# Patient Record
Sex: FEMALE | Race: WHITE | HISPANIC OR LATINO
[De-identification: names, ages, dates, MRNs, and addresses within clinical notes are randomized per-mention and may not be internally consistent; named-entity substitution may affect disease eponyms.]

---

## 2021-01-01 ENCOUNTER — OFFICE VISIT (OUTPATIENT)
Age: 71
End: 2021-01-01

## 2021-04-07 ENCOUNTER — OFFICE VISIT (OUTPATIENT)
Age: 71
End: 2021-04-07

## 2021-04-28 ENCOUNTER — OFFICE VISIT (OUTPATIENT)
Age: 71
End: 2021-04-28

## 2022-04-06 ENCOUNTER — OFFICE VISIT (OUTPATIENT)
Dept: URBAN - METROPOLITAN AREA CLINIC 68 | Facility: CLINIC | Age: 72
End: 2022-04-06

## 2022-05-25 ENCOUNTER — TELEPHONE ENCOUNTER (OUTPATIENT)
Dept: URBAN - METROPOLITAN AREA CLINIC 68 | Facility: CLINIC | Age: 72
End: 2022-05-25

## 2022-06-04 ENCOUNTER — TELEPHONE ENCOUNTER (OUTPATIENT)
Dept: URBAN - METROPOLITAN AREA CLINIC 68 | Facility: CLINIC | Age: 72
End: 2022-06-04

## 2022-06-04 RX ORDER — TRAMADOL HYDROCHLORIDE 50 MG/1
TRAMADOL HCL( 50MG ORAL  TWO TIMES DAILY ) INACTIVE -HX ENTRY TABLET ORAL
OUTPATIENT
Start: 2017-03-22

## 2022-06-05 ENCOUNTER — TELEPHONE ENCOUNTER (OUTPATIENT)
Dept: URBAN - METROPOLITAN AREA CLINIC 68 | Facility: CLINIC | Age: 72
End: 2022-06-05

## 2022-06-05 RX ORDER — MELOXICAM 15 MG/1
MELOXICAM( 15MG ORAL  DAILY ) ACTIVE -HX ENTRY TABLET ORAL DAILY
Status: ACTIVE | COMMUNITY
Start: 2017-03-22

## 2022-06-05 RX ORDER — LEVOTHYROXINE SODIUM 112 UG/1
SYNTHROID( 112MCG ORAL  DAILY ) ACTIVE -HX ENTRY TABLET ORAL DAILY
Status: ACTIVE | COMMUNITY
Start: 2017-03-22

## 2022-06-17 ENCOUNTER — OFFICE VISIT (OUTPATIENT)
Dept: URBAN - METROPOLITAN AREA CLINIC 68 | Facility: CLINIC | Age: 72
End: 2022-06-17

## 2022-06-25 ENCOUNTER — TELEPHONE ENCOUNTER (OUTPATIENT)
Age: 72
End: 2022-06-25

## 2022-06-25 RX ORDER — SODIUM SULFATE, POTASSIUM SULFATE, MAGNESIUM SULFATE 17.5; 3.13; 1.6 G/ML; G/ML; G/ML
SOLUTION, CONCENTRATE ORAL AS DIRECTED
Qty: 1 | Refills: 0 | OUTPATIENT
Start: 2017-03-22 | End: 2017-03-23

## 2022-06-25 RX ORDER — SODIUM SULFATE, POTASSIUM SULFATE, MAGNESIUM SULFATE 17.5; 3.13; 1.6 G/ML; G/ML; G/ML
SOLUTION, CONCENTRATE ORAL AS DIRECTED
Qty: 1 | Refills: 0 | OUTPATIENT
Start: 2011-10-28 | End: 2017-03-22

## 2022-06-25 RX ORDER — TRAMADOL HYDROCHLORIDE 50 MG/1
TRAMADOL HCL( 50MG ORAL  TWO TIMES DAILY ) INACTIVE -HX ENTRY TABLET ORAL
OUTPATIENT
Start: 2017-03-22

## 2022-06-26 ENCOUNTER — TELEPHONE ENCOUNTER (OUTPATIENT)
Age: 72
End: 2022-06-26

## 2022-06-26 RX ORDER — SODIUM SULFATE, MAGNESIUM SULFATE, AND POTASSIUM CHLORIDE 17.75; 2.7; 2.25 G/1; G/1; G/1
TABLET ORAL AS DIRECTED
Qty: 24 | Refills: 0 | Status: ACTIVE | COMMUNITY
Start: 2022-06-17

## 2022-06-26 RX ORDER — MELOXICAM 15 MG/1
MELOXICAM( 15MG ORAL  DAILY ) ACTIVE -HX ENTRY TABLET ORAL DAILY
Status: ACTIVE | COMMUNITY
Start: 2017-03-22

## 2022-06-26 RX ORDER — LEVOTHYROXINE SODIUM 112 MCG
SYNTHROID( 112MCG ORAL  DAILY ) ACTIVE -HX ENTRY TABLET ORAL DAILY
Status: ACTIVE | COMMUNITY
Start: 2017-03-22

## 2022-06-26 RX ORDER — CHOLECALCIFEROL (VITAMIN D3) 25 MCG
VITAMIN D( 1000UNIT ORAL  DAILY ) ACTIVE -HX ENTRY TABLET ORAL DAILY
Status: ACTIVE | COMMUNITY
Start: 2017-03-22

## 2022-08-15 ENCOUNTER — OFFICE VISIT (OUTPATIENT)
Dept: URBAN - METROPOLITAN AREA SURGERY CENTER 12 | Facility: SURGERY CENTER | Age: 72
End: 2022-08-15

## 2022-08-15 RX ORDER — MELOXICAM 15 MG/1
MELOXICAM( 15MG ORAL  DAILY ) ACTIVE -HX ENTRY TABLET ORAL DAILY
Status: ACTIVE | COMMUNITY
Start: 2017-03-22

## 2022-08-15 RX ORDER — LEVOTHYROXINE SODIUM 112 UG/1
SYNTHROID( 112MCG ORAL  DAILY ) ACTIVE -HX ENTRY TABLET ORAL DAILY
Status: ACTIVE | COMMUNITY
Start: 2017-03-22

## 2022-08-26 ENCOUNTER — OFFICE VISIT (OUTPATIENT)
Dept: URBAN - METROPOLITAN AREA CLINIC 68 | Facility: CLINIC | Age: 72
End: 2022-08-26

## 2022-08-26 ENCOUNTER — TELEPHONE ENCOUNTER (OUTPATIENT)
Dept: URBAN - METROPOLITAN AREA CLINIC 68 | Facility: CLINIC | Age: 72
End: 2022-08-26

## 2022-08-26 ENCOUNTER — DASHBOARD ENCOUNTERS (OUTPATIENT)
Age: 72
End: 2022-08-26

## 2022-08-26 RX ORDER — LEVOTHYROXINE SODIUM 112 UG/1
SYNTHROID( 112MCG ORAL  DAILY ) ACTIVE -HX ENTRY TABLET ORAL DAILY
Status: ACTIVE | COMMUNITY
Start: 2017-03-22

## 2022-08-26 RX ORDER — HYDROCORTISONE ACETATE AND PRAMOXINE HYDROCHLORIDE 25; 10 MG/G; MG/G
1 APPLICATION CREAM TOPICAL
Qty: 20 APPLICATOR | Refills: 4 | OUTPATIENT

## 2022-08-26 RX ORDER — MELOXICAM 15 MG/1
MELOXICAM( 15MG ORAL  DAILY ) ACTIVE -HX ENTRY TABLET ORAL DAILY
Status: ACTIVE | COMMUNITY
Start: 2017-03-22

## 2022-08-26 NOTE — HPI-MIGRATED HPI
Transition to Care : 72 y.o. WF with a past history of H. pylori and a personal history of colon polyps, diverticulosis, and hemorrhoids who agrees to phone visit follow up of recent EGD and colonoscopy on 8/17/2022. There was a small hiatal hernia, mild gastritis and duodenitis. Pathology negative for celiac or H. pylori. On colonoscopy, she had total of 4 colon polyps, diverticulosis and IH. Pathology consistent with tubular adenomas and sessile serrated hyperplastic polyps. She does have some hemorrhoidal discomfort and is recommended to consider hemorrhoid banding. She will try hemorrhoidal cream.

## 2024-12-11 ENCOUNTER — OFFICE VISIT (OUTPATIENT)
Dept: URBAN - METROPOLITAN AREA CLINIC 68 | Facility: CLINIC | Age: 74
End: 2024-12-11
Payer: MEDICARE

## 2024-12-11 ENCOUNTER — TELEPHONE ENCOUNTER (OUTPATIENT)
Dept: URBAN - METROPOLITAN AREA CLINIC 68 | Facility: CLINIC | Age: 74
End: 2024-12-11

## 2024-12-11 ENCOUNTER — LAB OUTSIDE AN ENCOUNTER (OUTPATIENT)
Dept: URBAN - METROPOLITAN AREA CLINIC 68 | Facility: CLINIC | Age: 74
End: 2024-12-11

## 2024-12-11 VITALS
DIASTOLIC BLOOD PRESSURE: 70 MMHG | HEIGHT: 63 IN | WEIGHT: 154 LBS | OXYGEN SATURATION: 99 % | HEART RATE: 73 BPM | BODY MASS INDEX: 27.29 KG/M2 | TEMPERATURE: 97.8 F | SYSTOLIC BLOOD PRESSURE: 126 MMHG

## 2024-12-11 DIAGNOSIS — R19.5 OCCULT BLOOD POSITIVE STOOL: ICD-10-CM

## 2024-12-11 DIAGNOSIS — K64.9 HEMORRHOIDS: ICD-10-CM

## 2024-12-11 DIAGNOSIS — K57.90 DIVERTICULOSIS: ICD-10-CM

## 2024-12-11 DIAGNOSIS — K44.9 HIATAL HERNIA: ICD-10-CM

## 2024-12-11 DIAGNOSIS — D12.6 ADENOMATOUS COLON POLYP: ICD-10-CM

## 2024-12-11 PROBLEM — 59614000: Status: ACTIVE | Noted: 2024-12-11

## 2024-12-11 PROCEDURE — 99214 OFFICE O/P EST MOD 30 MIN: CPT

## 2024-12-11 RX ORDER — MELOXICAM 15 MG/1
MELOXICAM( 15MG ORAL  DAILY ) ACTIVE -HX ENTRY TABLET ORAL DAILY
Status: ACTIVE | COMMUNITY
Start: 2017-03-22

## 2024-12-11 RX ORDER — LEVOTHYROXINE SODIUM 112 UG/1
SYNTHROID( 112MCG ORAL  DAILY ) ACTIVE -HX ENTRY TABLET ORAL DAILY
Status: ACTIVE | COMMUNITY
Start: 2017-03-22

## 2024-12-11 RX ORDER — HYDROCORTISONE ACETATE AND PRAMOXINE HYDROCHLORIDE 25; 10 MG/G; MG/G
1 APPLICATION CREAM TOPICAL
Qty: 20 APPLICATOR | Refills: 4 | Status: ACTIVE | COMMUNITY

## 2024-12-11 RX ORDER — SOD SULF/POT CHLORIDE/MAG SULF 1.479 G
12 TABLETS THE FIRST DOSE THE EVENING BEFORE AND SECOND DOSE THE MORNING OF COLONOSCOPY TABLET ORAL TWICE A DAY
Qty: 24 | OUTPATIENT
Start: 2024-12-11 | End: 2024-12-12

## 2024-12-11 NOTE — HPI-TODAY'S VISIT:
73 y/o F presents for colon cancer screening after positive test for blood in stool. Per patient, Dr. Rudd PCP ordered stool test that was positive for blood. Will request records. She denies any melena or hematochezia. Last colonoscopy was on 8/15/22 that showed IH, diverticulosis, polyps x 4. Denies nausea/vomiting, dysphagia, odynophagia, abdominal pain, weight loss, fever. Will order colonoscopy.

## 2025-01-24 ENCOUNTER — TELEPHONE ENCOUNTER (OUTPATIENT)
Dept: URBAN - METROPOLITAN AREA CLINIC 68 | Facility: CLINIC | Age: 75
End: 2025-01-24

## 2025-01-27 ENCOUNTER — CLAIMS CREATED FROM THE CLAIM WINDOW (OUTPATIENT)
Dept: URBAN - METROPOLITAN AREA SURGERY CENTER 12 | Facility: SURGERY CENTER | Age: 75
End: 2025-01-27
Payer: COMMERCIAL

## 2025-01-27 DIAGNOSIS — K57.30 DIVERTICULA, COLON: ICD-10-CM

## 2025-01-27 DIAGNOSIS — Z86.0100 HISTORY OF COLON POLYPS: ICD-10-CM

## 2025-01-27 DIAGNOSIS — R19.5 OTHER FECAL ABNORMALITIES: ICD-10-CM

## 2025-01-27 DIAGNOSIS — K64.0 FIRST DEGREE HEMORRHOIDS: ICD-10-CM

## 2025-01-27 DIAGNOSIS — R19.5 OCCULT BLOOD + STOOL: ICD-10-CM

## 2025-01-27 DIAGNOSIS — K57.30 DIVERTICULOSIS OF LARGE INTESTINE WITHOUT PERFORATION OR ABSCESS WITHOUT BLEEDING: ICD-10-CM

## 2025-01-27 DIAGNOSIS — Z12.11 COLON CANCER SCREENING: ICD-10-CM

## 2025-01-27 PROCEDURE — 00812 ANES LWR INTST SCR COLSC: CPT | Performed by: NURSE ANESTHETIST, CERTIFIED REGISTERED

## 2025-01-27 PROCEDURE — 45378 DIAGNOSTIC COLONOSCOPY: CPT | Performed by: INTERNAL MEDICINE

## 2025-01-27 RX ORDER — HYDROCORTISONE ACETATE AND PRAMOXINE HYDROCHLORIDE 25; 10 MG/G; MG/G
1 APPLICATION CREAM TOPICAL
Qty: 20 APPLICATOR | Refills: 4 | Status: ACTIVE | COMMUNITY

## 2025-01-27 RX ORDER — LEVOTHYROXINE SODIUM 112 UG/1
SYNTHROID( 112MCG ORAL  DAILY ) ACTIVE -HX ENTRY TABLET ORAL DAILY
Status: ACTIVE | COMMUNITY
Start: 2017-03-22

## 2025-01-27 RX ORDER — MELOXICAM 15 MG/1
MELOXICAM( 15MG ORAL  DAILY ) ACTIVE -HX ENTRY TABLET ORAL DAILY
Status: ACTIVE | COMMUNITY
Start: 2017-03-22

## 2025-02-14 ENCOUNTER — OFFICE VISIT (OUTPATIENT)
Dept: URBAN - METROPOLITAN AREA CLINIC 68 | Facility: CLINIC | Age: 75
End: 2025-02-14

## 2025-02-14 PROBLEM — 428283002: Status: ACTIVE | Noted: 2025-02-14

## 2025-02-14 RX ORDER — MELOXICAM 15 MG/1
MELOXICAM( 15MG ORAL  DAILY ) ACTIVE -HX ENTRY TABLET ORAL DAILY
COMMUNITY
Start: 2017-03-22

## 2025-02-14 RX ORDER — HYDROCORTISONE ACETATE AND PRAMOXINE HYDROCHLORIDE 25; 10 MG/G; MG/G
1 APPLICATION CREAM TOPICAL
Qty: 20 APPLICATOR | Refills: 4 | COMMUNITY

## 2025-02-14 RX ORDER — LEVOTHYROXINE SODIUM 112 UG/1
SYNTHROID( 112MCG ORAL  DAILY ) ACTIVE -HX ENTRY TABLET ORAL DAILY
COMMUNITY
Start: 2017-03-22

## 2025-02-14 NOTE — HPI-TODAY'S VISIT:
1/27/2025 Colonoscopy showed IH and diverticulosis, excellent prep, repeat in 5  75 y/o F presents for results s/p colonoscopy for evaluation of positive blood in stool at PCP. colon cancer screening after positive test for blood in stool. Colonoscopy showed IH and diverticulosis. Denies nausea/vomiting, dysphagia, odynophagia, abdominal pain, weight loss, fever, gib.

## 2025-03-14 ENCOUNTER — OFFICE VISIT (OUTPATIENT)
Dept: URBAN - METROPOLITAN AREA CLINIC 68 | Facility: CLINIC | Age: 75
End: 2025-03-14
Payer: COMMERCIAL

## 2025-03-14 DIAGNOSIS — R19.5 OCCULT BLOOD POSITIVE STOOL: ICD-10-CM

## 2025-03-14 DIAGNOSIS — K64.9 HEMORRHOIDS: ICD-10-CM

## 2025-03-14 DIAGNOSIS — Z86.0100 HISTORY OF COLON POLYPS: ICD-10-CM

## 2025-03-14 DIAGNOSIS — K57.90 DIVERTICULOSIS: ICD-10-CM

## 2025-03-14 PROCEDURE — 99214 OFFICE O/P EST MOD 30 MIN: CPT

## 2025-03-14 RX ORDER — MELOXICAM 15 MG/1
MELOXICAM( 15MG ORAL  DAILY ) ACTIVE -HX ENTRY TABLET ORAL DAILY
Status: ACTIVE | COMMUNITY
Start: 2017-03-22

## 2025-03-14 RX ORDER — HYDROCORTISONE ACETATE AND PRAMOXINE HYDROCHLORIDE 25; 10 MG/G; MG/G
1 APPLICATION CREAM TOPICAL
Qty: 20 APPLICATOR | Refills: 4 | Status: ACTIVE | COMMUNITY

## 2025-03-14 RX ORDER — LEVOTHYROXINE SODIUM 112 UG/1
SYNTHROID( 112MCG ORAL  DAILY ) ACTIVE -HX ENTRY TABLET ORAL DAILY
Status: ACTIVE | COMMUNITY
Start: 2017-03-22

## 2025-03-14 NOTE — HPI-TODAY'S VISIT:
75 y/o F presents for colonoscopy results after positive test for blood in stool. Colonoscopy showed IH and diverticulosis. She does experience some itching with hemorrhoids. She denies constipation. Will send hemorrhoid cream to [facility].